# Patient Record
Sex: FEMALE | URBAN - METROPOLITAN AREA
[De-identification: names, ages, dates, MRNs, and addresses within clinical notes are randomized per-mention and may not be internally consistent; named-entity substitution may affect disease eponyms.]

---

## 2021-06-29 ENCOUNTER — TELEPHONE (OUTPATIENT)
Dept: NURSING | Facility: CLINIC | Age: 71
End: 2021-06-29

## 2021-06-29 ENCOUNTER — PATIENT OUTREACH (OUTPATIENT)
Dept: ONCOLOGY | Facility: CLINIC | Age: 71
End: 2021-06-29

## 2021-06-29 NOTE — PROGRESS NOTES
[facility] Care Coordinator calling looking for Radiation records for patient who has a recurrence of Endometrial cancer. She will need radiation but before this can be planned or mapped out they need to know what radiation has been completed.     Patient had radiation at the [facility].     Care coordinator has contacted medical records and they are unable to help.     Care Coordinator updated that RN has no ability to find these records and are not located in patients chart.     Care coordinator appreciative of RN time    Milena Landry RN

## 2021-06-29 NOTE — TELEPHONE ENCOUNTER
Reason for call;  [facility] =  [facility] care coordinator Jahaira GOLDBERG Calling for medical record from [facility] oncology  from  The year = 2000' for this Pt . Pt had surgery and radiation for endometrial cancer with Dr Keisha Valdez ( who has moved to [place] ) and Pt has appt on Friday for follow up eval and  treatment .    Epic record doesn't go this far back.   Recommendation / teaching ; Jahaira left a voice  message for  [facility] oncology and given their office phone number.      Caller Verbalizes understanding and denies further questions and will call back if further symptoms to triage or questions  .  Keisha Jackson RN  - [facility] Nurse Advisor